# Patient Record
Sex: FEMALE | Race: WHITE | NOT HISPANIC OR LATINO | URBAN - METROPOLITAN AREA
[De-identification: names, ages, dates, MRNs, and addresses within clinical notes are randomized per-mention and may not be internally consistent; named-entity substitution may affect disease eponyms.]

---

## 2018-11-28 ENCOUNTER — OFFICE (OUTPATIENT)
Dept: URBAN - METROPOLITAN AREA CLINIC 45 | Facility: CLINIC | Age: 78
End: 2018-11-28

## 2018-11-28 VITALS
SYSTOLIC BLOOD PRESSURE: 121 MMHG | DIASTOLIC BLOOD PRESSURE: 69 MMHG | HEART RATE: 61 BPM | HEIGHT: 65 IN | WEIGHT: 150 LBS | TEMPERATURE: 97.3 F

## 2018-11-28 DIAGNOSIS — Z86.010 PERSONAL HISTORY OF COLON POLYPS: ICD-10-CM

## 2018-11-28 DIAGNOSIS — R14.0 ABDOMINAL BLOATING: ICD-10-CM

## 2018-11-28 DIAGNOSIS — K59.00 CONSTIPATION: ICD-10-CM

## 2018-11-28 DIAGNOSIS — K21.9 GASTROESOPHAGEAL REFLUX DISEASE: ICD-10-CM

## 2018-11-28 DIAGNOSIS — Z80.0 FAMILY HISTORY OF COLON CANCER: ICD-10-CM

## 2018-11-28 PROCEDURE — 99203 OFFICE O/P NEW LOW 30 MIN: CPT | Performed by: INTERNAL MEDICINE

## 2018-11-28 NOTE — SERVICEHPINOTES
The patient complains of symptoms suggestive of excessive intestinal gas.     Reports the onset of   bloating  several  weeks   ago  .    Symptoms now occur   several   times per   week  .    They may last up to   hours   at a time  .    Medications tried so far include   fiber supplements and OTC simethicone  with   partial   relief  .    Symptoms are worsened by   eating  .   Bloating/gas is relieved by   medication  .    Specific foods that potentially trigger symptoms include   fatty/greasy foods  .    The patient has associated   abdominal bloating and constipation   with this presentation.   Alarm symptoms reported:   .   Apparently had recent CT scan reportedly neg. Has occ constipation on stool softeners. No blood in stool or black stool. No recent fever, chills or wt loss. Follow-up of GERD was discussed.   The patient has typically complained of   heartburn and regurgitation  .      Treatment has consisted of   OTC antacids  taken at   prn  .   This therapy has been associated with   good   relief.   The patient   has not   been having breakthru GERD symptoms.  Symptoms do   not awaken the patient from sleep  .    Has been treating residual symptoms with   OTC antacids  .   Continuing symptoms may be brought on by   wine or alcohol and eating fatty/greasy foods  .

## 2019-01-02 ENCOUNTER — OFFICE (OUTPATIENT)
Dept: URBAN - METROPOLITAN AREA CLINIC 45 | Facility: CLINIC | Age: 79
End: 2019-01-02

## 2019-01-02 VITALS
HEIGHT: 65 IN | TEMPERATURE: 97.6 F | HEART RATE: 61 BPM | DIASTOLIC BLOOD PRESSURE: 65 MMHG | SYSTOLIC BLOOD PRESSURE: 120 MMHG | WEIGHT: 151 LBS

## 2019-01-02 DIAGNOSIS — K59.00 CONSTIPATION: ICD-10-CM

## 2019-01-02 DIAGNOSIS — R14.0 ABDOMINAL BLOATING: ICD-10-CM

## 2019-01-02 DIAGNOSIS — A04.9 SMALL BOWEL BACTERIAL OVERGROWTH SYNDROME: ICD-10-CM

## 2019-01-02 PROCEDURE — 99213 OFFICE O/P EST LOW 20 MIN: CPT | Performed by: INTERNAL MEDICINE

## 2019-01-02 NOTE — SERVICEHPINOTES
We have followed the patient for   constipation-predominant (IBS-C)   irritable bowel syndrome.    Currently on treatment with   stool softeners  .    Baseline clinical course has   improved   since last visit.    The patient   has   been doing well overall.    Currently having   1   bowel movement(s) per   day  .    Active GI symptoms include   abdominal bloating  .    They are considered by the patient to be   mild   in nature.    Alarm symptoms reported by the patient include   none  .    Symptoms have recently caused the patient to   see primary care physician  .   Recent lactulose breath test pos for Hydrogen producing bacteria. Pt feels better though with diet change.

## 2019-10-09 ENCOUNTER — HOSPITAL ENCOUNTER (EMERGENCY)
Dept: HOSPITAL 72 - EMR | Age: 79
Discharge: HOME | End: 2019-10-09
Payer: MEDICARE

## 2019-10-09 VITALS — WEIGHT: 150 LBS | HEIGHT: 64 IN | BODY MASS INDEX: 25.61 KG/M2

## 2019-10-09 VITALS — DIASTOLIC BLOOD PRESSURE: 61 MMHG | SYSTOLIC BLOOD PRESSURE: 132 MMHG
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
## 2019-10-09 DIAGNOSIS — S20.212A: Primary | ICD-10-CM

## 2019-10-09 DIAGNOSIS — E03.9: ICD-10-CM

## 2019-10-09 PROCEDURE — 71250 CT THORAX DX C-: CPT

## 2019-10-09 PROCEDURE — 93005 ELECTROCARDIOGRAM TRACING: CPT

## 2019-10-09 PROCEDURE — 99284 EMERGENCY DEPT VISIT MOD MDM: CPT

## 2019-10-09 NOTE — NUR
ER DISCHARGE NOTE:

Patient is cleared to be discharged per ERMD DR GOFF, pt is aox4, on room 
air, with stable vital signs. pt was given dc and prescription instructions, pt 
was able to verbalize understanding, pt id band  removed without complications. 
pt is able to ambulate with steady gait. pt took all belongings.

## 2019-10-09 NOTE — NUR
ED Nurse Note:

Patient walked into ED from home s/p mechanical fall 1 hour prior to arrival to 
ED. patient reports she tripped and fell on a curb and landed on the left side. 
patient denies any head trauma. 

patient is alert awake x4 ambulatory breathing unlabored and even, speaking in 
full sentences.

## 2019-10-09 NOTE — EMERGENCY ROOM REPORT
History of Present Illness


General


Chief Complaint:  Multiple Trauma/Fall


Source:  Patient





Present Illness


HPI


Patient is a 79-year-old female who presented after recent fall.  Patient had 

injury approximately 1 1/2 hours prior to arrival.  She denies loss of 

consciousness.  She denies other locations of pain other than the left side of 

her chest and abdomen.  Patient reports having some prior history of 

hypothyroidism.  She denies taking any anticoagulations.  She denies taking her 

head.  She denies any neck or back pain.  She been ambulatory after the fall.


Allergies:  


Coded Allergies:  


     No Known Allergies (Unverified , 10/9/19)





Patient History


Past Medical History:  see triage record


Reviewed Nursing Documentation:  PMH: Agreed; PSxH: Agreed





Nursing Documentation-PMH


Past Medical History:  No Stated History





Review of Systems


All Other Systems:  negative except mentioned in HPI





Physical Exam





Vital Signs








  Date Time  Temp Pulse Resp B/P (MAP) Pulse Ox O2 Delivery O2 Flow Rate FiO2


 


10/9/19 21:10 97.5 66 16 132/61 (84) 97 Room Air  








Sp02 EP Interpretation:  reviewed, normal


General Appearance:  normal inspection, well appearing, no apparent distress, 

alert, GCS 15


Head:  atraumatic


ENT:  normal ENT inspection, hearing grossly normal, normal voice


Neck:  normal inspection, full range of motion, supple, no bony tend


Respiratory:  normal breath sounds, no respiratory distress, no retraction, no 

wheezing, other - left chest wall tenderness


Cardiovascular #1:  regular rate, rhythm, no edema


Gastrointestinal:  normal inspection, normal bowel sounds, non tender, soft, no 

guarding, no hernia


Genitourinary:  no CVA tenderness


Musculoskeletal:  normal inspection, back normal, normal range of motion


Neurologic:  normal inspection, alert, responsive, CNs III-XII nml as tested, 

speech normal


Psychiatric:  normal inspection, judgement/insight normal, mood/affect normal





Medical Decision Making


Diagnostic Impression:  


 Primary Impression:  


 Fall


 Additional Impression:  


 Chest wall contusion


ER Course


She presented after a fall.  Differential diagnosis include was not limited to 

contusion, rib fracture, intracranial injury among others.  Because of 

complexity of patient's case imaging studies were ordered.  Patient was noted 

to have some significant discomfort to the left chest wall.  She was given pain 

medications.  CT imaging of the chest was ordered.CT of chest read by radiology 

showed fibrotic changes to the base of the lungs as well as liver calcification

, there is no pleural effusions noted.  Patient was noted to have degenerative 

changes to the spine without evident fracture.  There is no definite rib 

fracture noted per radiology report however given the patient's discomfort 

there may be some unrecognized fracture.  Patient given prescription for pain 

medications.  She was advised to follow-up with her primary care physician for 

recheck.  She is advised to return if worse.


EKG Diagnostic Results


Rate:  bradycardiac


Rhythm:  NSR


ST Segments:  no acute changes





Last Vital Signs








  Date Time  Temp Pulse Resp B/P (MAP) Pulse Ox O2 Delivery O2 Flow Rate FiO2


 


10/9/19 21:10 97.5 66 16 132/61 (84) 97 Room Air  








Status:  improved


Disposition:  HOME, SELF-CARE


Condition:  Stable


Scripts


Docusate Sodium* (COLACE*) 100 Mg Capsule


100 MG ORAL TWICE A DAY, #20 CAP


   Prov: Rod Shoemaker MD         10/9/19 


Hydrocodone Bit/Acetaminophen 5-325* (NORCO 5-325*) 1 Each Tablet


1 TAB ORAL Q6H PRN for For Pain, #20 TAB 0 Refills


   Prov: Rod Shoemaker MD         10/9/19











Rod Shoemaker MD Oct 9, 2019 21:38

## 2019-10-09 NOTE — DIAGNOSTIC IMAGING REPORT
Clinical Indication: Chest pain, status post fall

 

Technique: Spiral acquisitions obtained through the chest. No IV contrast utilized,

reason not stated. Multiplanar reconstructions generated. Total dose length product

993 mGycm. CTDIvol(s) 21 mGy.  Dose reduction achieved using automated exposure

control

 

 

Comparison: none

 

Findings: No acute fractures. No evidence of significant soft tissue contusion

demonstrated.

 

Lungs demonstrate bilateral lower lobe groundglass opacities. There is a

pleural-based nodule along the minor fissure which measures 4 mm in diameter, image

39 series 9. There is a subpleural 4 mm nodule in the anterolateral right middle

lobe, image 48 series 9 no infiltrates, effusions, or masses.

 

The heart size is is normal. There is trace pericardial thickening versus fluid. No

substernal hematoma. No mediastinal or hilar mass or adenopathy. The thyroid is

unremarkable. Esophagus is unremarkable. No axillary or chest wall mass or

adenopathy.

 

The included upper abdomen demonstrates a subcentimeter low-attenuation lesion within

segment 8 of the liver. There is a calcified granuloma in the dome of the liver.

There is a large cyst in the left kidney

 

Impression: No evidence of fracture, pneumothorax, or significant soft tissue injury

 

Basilar groundglass opacities, nonspecific, could represent atelectatic changes, COPD

changes, or mild pulmonary edema, among other possibilities

 

Trace pericardial thickening versus fluid

 

Incidental findings of probable right lobe liver cyst, left renal cyst, old

granulomatous disease within the liver

 

The above findings are essentially agrees with the [facility] preliminary report

provided overnight

 

Subpleural right lung nodules, as described. No further follow-up necessary there is

no significant smoking history or other risk factors for lung carcinoma. If there are

significant risk factors, then follow-up CT at 6-12 months should be considered. This

finding was discussed by phone with Dr. Blackburn at the time of interpretation

 

 

 

The CT scanner at [facility] is accredited by the [employer] College of

Radiology and the scans are performed using protocols designed to limit radiation

exposure to as low as reasonably achievable to attain images of sufficient resolution

adequate for diagnostic evaluation.

## 2019-10-10 NOTE — EMERGENCY ROOM REPORT
Physical Exam





Vital Signs








  Date Time  Temp Pulse Resp B/P (MAP) Pulse Ox O2 Delivery O2 Flow Rate FiO2


 


10/9/19 21:10 97.5 66 16 132/61 (84) 97 Room Air  











Medical Decision Making


PA Attestation


All my diagnosis and treatment plans were reviewed ad discussed with my 

supervising physician Dr. Blackburn


Diagnostic Impression:  


 Primary Impression:  


 Fall


 Additional Impression:  


 Chest wall contusion


ER Course


I discussed the findings of nodule and pleural bases of lungs with patient over 

the phone today at 12:15PM.  I advised the patient to follow-up with primary 

care provider as well as a pulmonologist for further assessment.  Patient 

agrees with the above recommendation





Last Vital Signs








  Date Time  Temp Pulse Resp B/P (MAP) Pulse Ox O2 Delivery O2 Flow Rate FiO2


 


10/9/19 23:07 97.6 82 16 132/61 97 Room Air  








Disposition:  HOME, SELF-CARE


Condition:  Stable


Scripts


Docusate Sodium* (COLACE*) 100 Mg Capsule


100 MG ORAL TWICE A DAY, #20 CAP


   Prov: Rod Shoemaker MD         10/9/19 


Hydrocodone Bit/Acetaminophen 5-325* (NORCO 5-325*) 1 Each Tablet


1 TAB ORAL Q6H PRN for For Pain, #20 TAB 0 Refills


   Prov: Rod Shoemaker MD         10/9/19


Referrals:  


NON PHYSICIAN (PCP)


Patient Instructions:  Chest Wall Pain, Easy-to-Read, Rib Fracture











[place] Oct 10, 2019 12:19

## 2020-01-23 ENCOUNTER — OFFICE (OUTPATIENT)
Dept: URBAN - METROPOLITAN AREA CLINIC 45 | Facility: CLINIC | Age: 80
End: 2020-01-23

## 2020-01-23 VITALS
DIASTOLIC BLOOD PRESSURE: 76 MMHG | HEART RATE: 68 BPM | HEIGHT: 65 IN | WEIGHT: 157 LBS | SYSTOLIC BLOOD PRESSURE: 118 MMHG | TEMPERATURE: 97.6 F

## 2020-01-23 DIAGNOSIS — K59.00 CONSTIPATION: ICD-10-CM

## 2020-01-23 DIAGNOSIS — Z80.0 FAMILY HISTORY OF COLON CANCER: ICD-10-CM

## 2020-01-23 DIAGNOSIS — K21.9 GASTROESOPHAGEAL REFLUX DISEASE: ICD-10-CM

## 2020-01-23 DIAGNOSIS — Z86.010 PERSONAL HISTORY OF COLON POLYPS: ICD-10-CM

## 2020-01-23 DIAGNOSIS — A04.9 SMALL BOWEL BACTERIAL OVERGROWTH SYNDROME: ICD-10-CM

## 2020-01-23 PROCEDURE — 99213 OFFICE O/P EST LOW 20 MIN: CPT | Performed by: INTERNAL MEDICINE

## 2020-01-23 NOTE — SERVICEHPINOTES
Patient presents for a screening colonoscopy. There has been a previous colon screening. The patient has a family history of colon cancer. Patient denies fever, nausea, vomiting, dysphagia, reflux, abdominal pain, change in bowel habits,, diarrhea, rectal bleeding, melena, and significant change in weight. Denies shortness of breath and chest pain.    We have followed the patient for evaluation and treatment of chronic constipation.    Since last visit, the patient has tried   fiber supplements and stool softeners  .    Symptoms have   improved  .    Currently the patient evacuates on average once every   1  day  .    Stools are   normal   in consistency.      Associated symptoms include   .   Symptoms have been severe enough to cause the patient to   see primary care physician  .      Evaluation thus far has included   colonoscopy  .

## 2020-02-17 ENCOUNTER — AMBULATORY SURGICAL CENTER (OUTPATIENT)
Dept: URBAN - METROPOLITAN AREA SURGERY 37 | Facility: SURGERY | Age: 80
End: 2020-02-17

## 2020-02-17 VITALS
WEIGHT: 148 LBS | RESPIRATION RATE: 21 BRPM | DIASTOLIC BLOOD PRESSURE: 71 MMHG | TEMPERATURE: 97.7 F | HEART RATE: 66 BPM | OXYGEN SATURATION: 93 % | SYSTOLIC BLOOD PRESSURE: 131 MMHG | HEIGHT: 65 IN

## 2020-02-17 DIAGNOSIS — K57.30 DVRTCLOS OF LG INT W/O PERFORATION OR ABSCESS W/O BLEEDING: ICD-10-CM

## 2020-02-17 DIAGNOSIS — Z80.0 FAMILY HISTORY OF MALIGNANT NEOPLASM OF DIGESTIVE ORGANS: ICD-10-CM

## 2020-02-17 DIAGNOSIS — K59.00 CONSTIPATION, UNSPECIFIED: ICD-10-CM

## 2020-02-17 DIAGNOSIS — D17.79 BENIGN LIPOMATOUS NEOPLASM OF OTHER SITES: ICD-10-CM

## 2020-02-17 DIAGNOSIS — R14.0 ABDOMINAL DISTENSION (GASEOUS): ICD-10-CM

## 2020-02-17 DIAGNOSIS — Z12.11 ENCOUNTER FOR SCREENING FOR MALIGNANT NEOPLASM OF COLON: ICD-10-CM

## 2020-02-17 LAB — SURGICAL: PDF REPORT: (no result)

## 2020-02-17 PROCEDURE — 45380 COLONOSCOPY AND BIOPSY: CPT | Performed by: INTERNAL MEDICINE

## 2020-02-17 NOTE — SERVICEHPINOTES
Patient presents for a screening colonoscopy. There has been a previous colon screening. The patient has a family history of colon cancer. Patient denies fever, nausea, vomiting, dysphagia, reflux, abdominal pain, change in bowel habits,, diarrhea, rectal bleeding, melena, and significant change in weight. Denies shortness of breath and chest pain. We have followed the patient for evaluation and treatment of chronic constipation. Since last visit, the patient has tried fiber supplements and stool softeners. Symptoms have improved. Currently the patient evacuates on average once every 1 day. Stools are normal in consistency. Symptoms have been severe enough to cause the patient to see primary care physician. Evaluation thus far has included colonoscopy.

## 2020-08-12 ENCOUNTER — OFFICE (OUTPATIENT)
Dept: URBAN - METROPOLITAN AREA CLINIC 45 | Facility: CLINIC | Age: 80
End: 2020-08-12

## 2020-08-12 VITALS
HEIGHT: 65 IN | WEIGHT: 153 LBS | SYSTOLIC BLOOD PRESSURE: 117 MMHG | DIASTOLIC BLOOD PRESSURE: 72 MMHG | TEMPERATURE: 97.8 F | HEART RATE: 70 BPM

## 2020-08-12 DIAGNOSIS — K59.00 CONSTIPATION: ICD-10-CM

## 2020-08-12 DIAGNOSIS — D17.79 BENIGN LIPOMATOUS NEOPLASM OF OTHER SITES: ICD-10-CM

## 2020-08-12 DIAGNOSIS — K57.30 DVRTCLOS OF LG INT W/O PERFORATION OR ABSCESS W/O BLEEDING: ICD-10-CM

## 2020-08-12 PROCEDURE — 99213 OFFICE O/P EST LOW 20 MIN: CPT | Performed by: INTERNAL MEDICINE

## 2020-08-12 NOTE — SERVICEHPINOTES
Pt presented to ER approx. 1 week ago with severe lower abd cramping pain and chills. Was diagnosed with sigmoid diverticulitis and sent home on Cipro and Flagyl. Feels better now wo abd pain,n/v, fever, chills or recent wt loss. No blood in stool or black stool. Recent colonoscopy mild divertics. Pt has hx of constipation, takes stool softener and probiotic. Usually has one bm daily

## 2024-05-08 ENCOUNTER — OFFICE (OUTPATIENT)
Dept: URBAN - METROPOLITAN AREA CLINIC 45 | Facility: CLINIC | Age: 84
End: 2024-05-08

## 2024-05-08 VITALS
WEIGHT: 162 LBS | HEART RATE: 81 BPM | SYSTOLIC BLOOD PRESSURE: 97 MMHG | HEIGHT: 65 IN | DIASTOLIC BLOOD PRESSURE: 58 MMHG

## 2024-05-08 DIAGNOSIS — Z80.0 FAMILY HISTORY OF COLON CANCER: ICD-10-CM

## 2024-05-08 DIAGNOSIS — K21.9 GERD: ICD-10-CM

## 2024-05-08 PROCEDURE — 99203 OFFICE O/P NEW LOW 30 MIN: CPT | Performed by: INTERNAL MEDICINE

## 2024-05-08 RX ORDER — OMEPRAZOLE 40 MG/1
80 CAPSULE, DELAYED RELEASE ORAL
Qty: 180 | Status: ACTIVE
Start: 2024-05-08

## 2024-05-08 NOTE — SERVICEHPINOTES
Follow-up of GERD was discussed.   The patient has typically complained of   heartburn and regurgitation  .      Treatment has consisted of   Omeprazole  taken at   once daily  .   This therapy has been associated with   partial   relief.   The patient   has   been having breakthru GERD symptoms.  Symptoms do   not awaken the patient from sleep  .    Has been treating residual symptoms with   OTC antacids  .   Continuing symptoms may be brought on by   nothing specific and excess caffeine intake  .    Recent worsening heartburn,no n/v or dysphagia.Pt denies any use of nsaids Bms ok, no blood in stool or black stool. Pt has fh colon ca however last 2 exams ess neg. Last colon 2020 regular

## 2024-06-03 ENCOUNTER — AMBULATORY SURGICAL CENTER (OUTPATIENT)
Dept: URBAN - METROPOLITAN AREA SURGERY 41 | Facility: SURGERY | Age: 84
End: 2024-06-03

## 2024-06-03 VITALS
HEIGHT: 65 IN | OXYGEN SATURATION: 97 % | HEART RATE: 65 BPM | RESPIRATION RATE: 14 BRPM | TEMPERATURE: 97.7 F | DIASTOLIC BLOOD PRESSURE: 73 MMHG | SYSTOLIC BLOOD PRESSURE: 121 MMHG | WEIGHT: 154 LBS

## 2024-06-03 DIAGNOSIS — K21.9 GASTRO-ESOPHAGEAL REFLUX DISEASE WITHOUT ESOPHAGITIS: ICD-10-CM

## 2024-06-03 DIAGNOSIS — K31.89 OTHER DISEASES OF STOMACH AND DUODENUM: ICD-10-CM

## 2024-06-03 DIAGNOSIS — K44.9 DIAPHRAGMATIC HERNIA WITHOUT OBSTRUCTION OR GANGRENE: ICD-10-CM

## 2024-06-03 DIAGNOSIS — K22.89 OTHER SPECIFIED DISEASE OF ESOPHAGUS: ICD-10-CM

## 2024-06-03 PROCEDURE — 43239 EGD BIOPSY SINGLE/MULTIPLE: CPT | Performed by: INTERNAL MEDICINE

## 2024-06-03 NOTE — SERVICEHPINOTES
Follow-up of GERD was discussed. The patient has typically complained of heartburn and regurgitation. Treatment has consisted of Omeprazole taken at once daily. This therapy has been associated with partial relief. The patient has been having breakthru GERD symptoms. Symptoms do not awaken the patient from sleep. Has been treating residual symptoms with OTC antacids. Continuing symptoms may be brought on by nothing specific and excess caffeine intake. Recent worsening heartburn,no n/v or dysphagia.Pt denies any use of nsaids Bms ok, no blood in stool or black stool. Pt has fh colon ca however last 2 exams ess neg. Last colon 2020

## 2024-06-13 LAB
GI HISTOLOGY: A: (no result)
GI HISTOLOGY: B: (no result)

## 2024-12-24 ENCOUNTER — OFFICE (OUTPATIENT)
Dept: URBAN - METROPOLITAN AREA CLINIC 45 | Facility: CLINIC | Age: 84
End: 2024-12-24

## 2024-12-24 VITALS
WEIGHT: 147 LBS | SYSTOLIC BLOOD PRESSURE: 108 MMHG | HEART RATE: 86 BPM | HEIGHT: 65 IN | TEMPERATURE: 97.7 F | DIASTOLIC BLOOD PRESSURE: 64 MMHG

## 2024-12-24 DIAGNOSIS — Z80.0 FAMILY HISTORY OF COLON CANCER: ICD-10-CM

## 2024-12-24 DIAGNOSIS — A04.8 H. PYLORI INFECTION: ICD-10-CM

## 2024-12-24 DIAGNOSIS — R19.7 DIARRHEA (UNSPECIFIED): ICD-10-CM

## 2024-12-24 DIAGNOSIS — K21.9 GERD: ICD-10-CM

## 2024-12-24 PROCEDURE — 99214 OFFICE O/P EST MOD 30 MIN: CPT | Performed by: INTERNAL MEDICINE

## 2024-12-24 NOTE — SERVICEHPINOTES
The patient complains of diarrhea.    Diarrheal symptoms started   a few  weeks   ago.   Bowel movements have been occurring   several   times per day.    At the onset, diarrhea started   abruptly  .   Stools have been occurring   after meals  .   The stool is described as   watery ([place] Stool Type 7) and loose and mushy ([place] Stool Type 6)   in consistency.   The patient   has not   seen blood in the stool.   The patient has also noted   fecal urgency  .   Possible exposures/etiologies include   travel history  .    Suspected exacerbating factors include   .   Diarrhea is alleviated so far by   taking OTC anti-diarrheal remedies  .   The patient has already tried taking   Pepto Bismol  .   Has noted   partial   relief.   Has had    performed thus far for evaluation.   A prior colonoscopy was performed   4   years ago.   No recent fever or chills. No blood in stool or black stool. Follow-up of GERD was discussed.   The patient has typically complained of   heartburn and regurgitation  .      Treatment has consisted of   Omeprazole  taken at   once daily  .   This therapy has been associated with   good   relief.   The patient   has not   been having breakthru GERD symptoms.  Symptoms do   not awaken the patient from sleep  .    Has been treating residual symptoms with   OTC antacids  .   Continuing symptoms may be brought on by   nothing specific  .    No dysphagia,n/v. Pt has persistent H pylori infection. Pt has strong fh colon ca, will be due for fu colon Feb 2025

## 2025-02-03 ENCOUNTER — AMBULATORY SURGICAL CENTER (OUTPATIENT)
Dept: URBAN - METROPOLITAN AREA SURGERY 41 | Facility: SURGERY | Age: 85
End: 2025-02-03

## 2025-02-03 VITALS
DIASTOLIC BLOOD PRESSURE: 74 MMHG | HEART RATE: 73 BPM | WEIGHT: 147 LBS | OXYGEN SATURATION: 98 % | TEMPERATURE: 97.3 F | RESPIRATION RATE: 20 BRPM | SYSTOLIC BLOOD PRESSURE: 121 MMHG | HEIGHT: 65 IN

## 2025-02-03 DIAGNOSIS — K22.89 OTHER SPECIFIED DISEASE OF ESOPHAGUS: ICD-10-CM

## 2025-02-03 DIAGNOSIS — K31.89 OTHER DISEASES OF STOMACH AND DUODENUM: ICD-10-CM

## 2025-02-03 DIAGNOSIS — K44.9 DIAPHRAGMATIC HERNIA WITHOUT OBSTRUCTION OR GANGRENE: ICD-10-CM

## 2025-02-03 DIAGNOSIS — K29.70 GASTRITIS, UNSPECIFIED, WITHOUT BLEEDING: ICD-10-CM

## 2025-02-03 DIAGNOSIS — K21.9 GASTRO-ESOPHAGEAL REFLUX DISEASE WITHOUT ESOPHAGITIS: ICD-10-CM

## 2025-02-03 PROBLEM — R19.7 CLINICALLY SIGNIFICANT DIARRHEA OF UNEXPLAINED ORIGIN: Status: ACTIVE | Noted: 2025-02-03

## 2025-02-03 PROBLEM — K57.30 DIVERTICULOSIS OF LARGE INTESTINE WITHOUT PERFORATION OR ABS: Status: ACTIVE | Noted: 2025-02-03

## 2025-02-03 PROCEDURE — 43239 EGD BIOPSY SINGLE/MULTIPLE: CPT | Performed by: INTERNAL MEDICINE

## 2025-02-03 PROCEDURE — 45378 DIAGNOSTIC COLONOSCOPY: CPT | Performed by: INTERNAL MEDICINE

## 2025-02-03 NOTE — SERVICEHPINOTES
The patient complains of diarrhea.  Diarrheal symptoms started a few weeks ago.  Bowel movements have been occurring several times per day. At the onset, diarrhea started abruptly. Stools have been occurring after meals. The stool is described as watery ([place] Stool Type 7) and loose and mushy ([place] Stool Type 6) in consistency.  The patient has not seen blood in the stool. The patient has also noted fecal urgency. Possible exposures/etiologies include travel history. Diarrhea is alleviated so far by taking OTC anti-diarrheal remedies. The patient has already tried taking Pepto Bismol. Has noted partial relief.  A prior colonoscopy was performed 4 years ago. No recent fever or chills. No blood in stool or black stool. Follow-up of GERD was discussed. The patient has typically complained of heartburn and regurgitation. Treatment has consisted of Omeprazole taken at once daily. This therapy has been associated with good relief. The patient has not been having breakthru GERD symptoms. Symptoms do not awaken the patient from sleep. Has been treating residual symptoms with OTC antacids. Continuing symptoms may be brought on by nothing specific. No dysphagia,n/v. Pt has persistent H pylori infection. Pt has strong fh colon ca, will be due for fu colon Feb 2025

## 2025-02-06 LAB
GI HISTOLOGY: A: (no result)
GI HISTOLOGY: B: (no result)

## 2025-02-12 ENCOUNTER — OFFICE (OUTPATIENT)
Dept: URBAN - METROPOLITAN AREA CLINIC 45 | Facility: CLINIC | Age: 85
End: 2025-02-12

## 2025-02-12 VITALS — HEIGHT: 65 IN

## 2025-02-12 DIAGNOSIS — K21.9 GERD: ICD-10-CM

## 2025-02-12 DIAGNOSIS — A04.8 H. PYLORI INFECTION: ICD-10-CM

## 2025-02-12 DIAGNOSIS — K57.30 DIVERTICULOSIS OF LARGE INTESTINE WITHOUT PERFORATION OR ABS: ICD-10-CM

## 2025-02-12 PROCEDURE — 99213 OFFICE O/P EST LOW 20 MIN: CPT | Mod: 93

## 2025-02-12 RX ORDER — AMOXICILLIN 500 MG/1
3000 TABLET, FILM COATED ORAL
Qty: 60 | Status: ACTIVE
Start: 2025-02-12

## 2025-02-12 RX ORDER — RIFABUTIN 150 MG/1
CAPSULE ORAL
Qty: 10 | Status: ACTIVE
Start: 2025-02-12

## 2025-02-12 RX ORDER — OMEPRAZOLE 40 MG/1
CAPSULE, DELAYED RELEASE ORAL
Qty: 20 | Status: ACTIVE
Start: 2025-02-12

## 2025-02-12 NOTE — SERVICEHPINOTES
The patient is scheduled today for a telehealth visit.Due to tech reasons only audio could be accomplished The clinician is connected to a secure network. The patient consents to this teleconference being a billable service. This visit used audio only for a live, interactive audio  visit. Recent egd with H pylori gastritis wo ulcer.HH was noted as well Follow-up of GERD was discussed.   The patient has typically complained of   heartburn and regurgitation  .      Treatment has consisted of   Prilosec OTC  taken at   once daily  .   This therapy has been associated with   good   relief.   The patient   has not   been having breakthru GERD symptoms.  Symptoms do   not awaken the patient from sleep  .    Has been treating residual symptoms with   OTC antacids  .   Continuing symptoms may be brought on by   nothing specific  .    No dysphagia,n/v. Recent colonoscopy with mild divertics, no polyps